# Patient Record
Sex: MALE
[De-identification: names, ages, dates, MRNs, and addresses within clinical notes are randomized per-mention and may not be internally consistent; named-entity substitution may affect disease eponyms.]

---

## 2024-05-03 ENCOUNTER — APPOINTMENT (OUTPATIENT)
Dept: OTOLARYNGOLOGY | Facility: CLINIC | Age: 74
End: 2024-05-03
Payer: MEDICARE

## 2024-05-03 DIAGNOSIS — H93.299 OTHER ABNORMAL AUDITORY PERCEPTIONS, UNSPECIFIED EAR: ICD-10-CM

## 2024-05-03 DIAGNOSIS — Z78.9 OTHER SPECIFIED HEALTH STATUS: ICD-10-CM

## 2024-05-03 DIAGNOSIS — R09.89 OTHER SPECIFIED SYMPTOMS AND SIGNS INVOLVING THE CIRCULATORY AND RESPIRATORY SYSTEMS: ICD-10-CM

## 2024-05-03 DIAGNOSIS — Z86.39 PERSONAL HISTORY OF OTHER ENDOCRINE, NUTRITIONAL AND METABOLIC DISEASE: ICD-10-CM

## 2024-05-03 DIAGNOSIS — Z86.79 PERSONAL HISTORY OF OTHER DISEASES OF THE CIRCULATORY SYSTEM: ICD-10-CM

## 2024-05-03 PROBLEM — Z00.00 ENCOUNTER FOR PREVENTIVE HEALTH EXAMINATION: Status: ACTIVE | Noted: 2024-05-03

## 2024-05-03 PROCEDURE — 92550 TYMPANOMETRY & REFLEX THRESH: CPT | Mod: 52

## 2024-05-03 PROCEDURE — 31231 NASAL ENDOSCOPY DX: CPT

## 2024-05-03 PROCEDURE — 92557 COMPREHENSIVE HEARING TEST: CPT

## 2024-05-03 PROCEDURE — 99203 OFFICE O/P NEW LOW 30 MIN: CPT | Mod: 25

## 2024-05-03 RX ORDER — RIVAROXABAN 2.5 MG/1
TABLET, FILM COATED ORAL
Refills: 0 | Status: ACTIVE | COMMUNITY

## 2024-05-03 RX ORDER — OLMESARTAN MEDOXOMIL 40 MG/1
TABLET, FILM COATED ORAL
Refills: 0 | Status: ACTIVE | COMMUNITY

## 2024-05-03 RX ORDER — AMLODIPINE BESYLATE 5 MG/1
TABLET ORAL
Refills: 0 | Status: ACTIVE | COMMUNITY

## 2024-05-03 NOTE — ASSESSMENT
[FreeTextEntry1] : He has a 2-month history of left-sided tinnitus.  He is ears were normal on exam.  Audiogram showed a bilateral sensorineural hearing loss with a slight asymmetry in the left ear.  He also had mild eustachian tube dysfunction with type Ad tympanograms  He has chronic throat clearing and phlegm in the morning.  On exam, he had a deviated septum to the right, inferior turbinate hypertrophy, and thick opaque mucus on the left side possibly from the middle meatus.  We discussed the possibility of a persistent sinus infection.  He does have a history of dental implants on that side.  He also has a history of intermittent left-sided headaches.  PLAN  - findings and management options discussed with the patient.  - Good aural hygiene. - noise precautions - repeat audiogram in 1 year - literature regarding tinnitus given - Avoid substances that can make tinnitus worse.    - They may use a white noise maker or leave the tv/radio on when it is quiet  -We discussed treatment options for tinnitus including hearing aid, tinnitus masker, and tinnitus therapy - He was given literature regarding postnasal drip - I recommended nasal saline irrigations and a trial of a nasal steroid spray -We discussed placing him on a course of antibiotics for the drainage.  I am going to do that - We discussed obtaining an MRI of the IACs to rule out retrocochlear pathology causing the left-sided tinnitus and mild asymmetry in hearing.  It should also include the maxillary sinus.  He is going to go for the scan.  We may also need a CT scan of the sinuses depending on the results. - follow up i after the MRI - call and return earlier if any concerns

## 2024-05-03 NOTE — REASON FOR VISIT
[Initial Evaluation] : an initial evaluation for [Tinnitus] : tinnitus [FreeTextEntry2] : throat clearing

## 2024-05-03 NOTE — CONSULT LETTER
[Dear  ___] : Dear  [unfilled], [Courtesy Letter:] : I had the pleasure of seeing your patient, [unfilled], in my office today. [Please see my note below.] : Please see my note below. [Consult Closing:] : Thank you very much for allowing me to participate in the care of this patient.  If you have any questions, please do not hesitate to contact me. [Sincerely,] : Sincerely, [FreeTextEntry3] : Mariel Lawrence MD [facility] at [facility] Otolaryngology  Head & Neck Surgery [facility] Eye, Ear & Throat [facility]   Department of Otolaryngology [facility] at [facility]  Office Tel: [phone]

## 2024-05-03 NOTE — HISTORY OF PRESENT ILLNESS
[de-identified] : SANDOR SIMS is a 73 year old patient Here for a 2-month history of tinnitus in the left ear.  He started taking a statin for elevated cholesterol about 4 months ago.  He then developed left-sided tinnitus.  It is constant and high-pitched.  He has no otalgia, otorrhea, or dizziness.  He stopped the statin but the tinnitus has persisted.  He also has a history of throat clearing and phlegm in the morning for the past 2.5 years.  He denies throat pain, dysphagia, voice change, nasal obstruction, or nasal congestion.  He did have dental implants placed on the left side about 2 years ago. HE also has left sided headaches.   No history of recurrent ear infections, prior otologic surgery, ear trauma, or excessive noise exposure  He may have TMJ dysfunction. He does not smoke He does not suffer from allergies

## 2024-05-03 NOTE — PHYSICAL EXAM
[TextEntry] : PHYSICAL EXAM  General: The patient was alert, oriented and in no distress. Voice was clear.  Face: The patient had no facial asymmetry or mass. The skin was unremarkable.  Ears: Hearing normal to conversational voice External ears were normal without deformity. Ear canals were clear. No cerumen or inflammation Tympanic membranes were intact and normal. No perforation or effusion. mobile  Nose:  The external nose had no significant deformity.   . On anterior rhinoscopy, the nasal mucosa was clear.  The anterior septum was grossly midline. There were no visualized polyps, purulence  or masses.   Oral cavity: Oral mucosa- normal. Oral and base of tongue- clear and without mass. Gingival and buccal mucosa- moist and without lesions. Palate- the palate moved well. There was no cleft palate. There appeared to be good salivary flow.   Oral cavity/oropharynx- no pus, erythema or mass  Neck:  The neck was symmetrical. The parotid and submandibular glands were normal without masses. The trachea was midline and there was no unusual crepitus. Thyroid was smooth and nontender and no masses were palpated. No masses  Lymphatics: Cervical adenopathy- none.    PROCEDURE:  FLEXIBLE LARYNGOSCOPY, NASAL ENDOSCOPY   Surgeon: Dr. Lawrence Indication: Throat clearing, phlegm, assess for postnasal drip and sinus infection, inadequate exam on anterior rhinoscopy Anesthetic: Topical lidocaine and Afrin Procedure: The patient was placed in a sitting position.  Following application of the topical anesthetic and decongestant, exam was performed with a flexible telescope.  The scope was passed along the right nasal floor to the nasopharynx.  It was then passed into the region of the middle meatus, middle turbinate, and sphenoethmoid region.  An identical procedure was performed on the left side.  The following findings were noted:  Nasal mucosa: Mild edema Septum: deviated to the right Right nasal cavity      Inferior turbinate: Hypertrophic      Middle turbinate: normal      Superior turbinate: normal      Inferior meatus: no pus, polyps or congestion      Middle meatus:  no pus, polyps or congestion       Superior meatus:  no pus, polyps, or congestion      Sphenoethmoidal recess: no pus, polyps or congestion  Left nasal cavity-there was thick mucoid drainage along the floor of the posterior nasal cavity streaming from the posterior middle meatus      Inferior turbinate: Hypertrophic      Middle turbinate: normal      Superior turbinate: normal      Inferior meatus: no pus, polyps or congestion      Middle meatus: no pus, polyps, or congestion      Superior meatus:  no pus, polyps, or congestion      Sphenoethmoidal recess: no pus, polyps or congestion   Nasopharynx: no masses, choanae patent, no adenoid tissue  Base of tongue and vallecula: no masses or asymmetry Posterior pharyngeal wall: no masses.  Hypopharynx: symmetrical. No masses Pyriform sinuses: no lesions or pooling of secretions Epiglottis: normal. No edema or lesions Aryepiglottic folds: normal. No lesions.  True vocal cords: clear and mobile. No lesions. Airway patent False vocal cords: normal Ventricles: no masses.  Arytenoids: Normal Interarytenoid area: no masses.  Normal Subglottis: normal. no masses

## 2024-05-06 PROBLEM — H93.299 ABNORMAL AUDITORY PERCEPTION: Status: ACTIVE | Noted: 2024-05-06

## 2024-05-21 ENCOUNTER — APPOINTMENT (OUTPATIENT)
Dept: MRI IMAGING | Facility: CLINIC | Age: 74
End: 2024-05-21
Payer: MEDICARE

## 2024-05-21 ENCOUNTER — OUTPATIENT (OUTPATIENT)
Dept: OUTPATIENT SERVICES | Facility: HOSPITAL | Age: 74
LOS: 1 days | End: 2024-05-21

## 2024-05-21 PROCEDURE — 70553 MRI BRAIN STEM W/O & W/DYE: CPT | Mod: 26,MH

## 2024-06-07 ENCOUNTER — APPOINTMENT (OUTPATIENT)
Dept: OTOLARYNGOLOGY | Facility: CLINIC | Age: 74
End: 2024-06-07
Payer: MEDICARE

## 2024-06-07 DIAGNOSIS — J32.8 OTHER CHRONIC SINUSITIS: ICD-10-CM

## 2024-06-07 DIAGNOSIS — R09.82 POSTNASAL DRIP: ICD-10-CM

## 2024-06-07 PROCEDURE — 99213 OFFICE O/P EST LOW 20 MIN: CPT

## 2024-06-07 NOTE — CONSULT LETTER
[Dear  ___] : Dear  [unfilled], [Courtesy Letter:] : I had the pleasure of seeing your patient, [unfilled], in my office today. [Please see my note below.] : Please see my note below. [Consult Closing:] : Thank you very much for allowing me to participate in the care of this patient.  If you have any questions, please do not hesitate to contact me. [Sincerely,] : Sincerely, [FreeTextEntry3] : Mariel Lawrence MD [facility] at [facility] Otolaryngology  Head & Neck Surgery [facility] Eye, Ear & Throat [facility]   Department of Otolaryngology [facility] at [facility]  Office Tel: [phone]

## 2024-06-07 NOTE — PHYSICAL EXAM
[TextEntry] : General: The patient was alert, oriented and in no distress. Voice was clear.  Face: The patient had no facial asymmetry or mass. The skin was unremarkable.  Ears: Hearing normal to conversational voice External ears were normal without deformity.   Nose: The external nose had no significant deformity. . On anterior rhinoscopy, the nasal mucosa was clear. The anterior septum was grossly midline. There were no visualized polyps, purulence or masses.  Neck: The neck was symmetrical.     PROCEDURE:   NASAL ENDOSCOPY  Surgeon: Dr. Lawrence Indication: chronic left maxillary sinusitis, assess for persistent infection inadequate exam on anterior rhinoscopy Anesthetic: Topical lidocaine and Afrin Procedure: The patient was placed in a sitting position. Following application of the topical anesthetic and decongestant, exam was performed with a flexible telescope. The scope was passed along the left nasal floor to the nasopharynx. It was then passed into the region of the middle meatus, middle turbinate, and sphenoethmoid region. The following findings were noted:  Nasal mucosa: Mild edema Septum: deviated to the right  Right nasal cavity  Inferior turbinate: Hypertrophic Left nasal cavity-  Inferior turbinate: Hypertrophic  Middle turbinate: normal  Superior turbinate: normal  Inferior meatus: no pus, polyps or congestion  Middle meatus: There was still whitish drainage from the middle meatus.  A culture was taken  Superior meatus: no pus, polyps, or congestion  Sphenoethmoidal recess: no pus, polyps or congestion  Nasopharynx: no masses, choanae patent, no adenoid tissue

## 2024-06-07 NOTE — HISTORY OF PRESENT ILLNESS
[de-identified] : SANDOR SIMS is a 73 year old patient Here to review his MRI.  He has a history of left-sided tinnitus for the past 2 to 3 months.  He had a mild asymmetry in his hearing on audiogram.  The tinnitus may be a little bit less bothersome.  The MRI showed no IAC masses or lesions.  There was mild parenchymal volume loss.  There was enhancement and opacification of the hypoplastic left maxillary sinus with mild mucosal thickening in the ethmoid air cells  He was also found to have chronic left maxillary sinusitis on nasal endoscopy.  He has had throat clearing and phlegm in the throat for at least 2 years.  He was placed on antibiotics.  It may have helped a little bit.  ENT history No history of recurrent ear infections, prior otologic surgery, ear trauma, or excessive noise exposure  He may have TMJ dysfunction. He does not smoke He does not suffer from allergies

## 2024-06-07 NOTE — ASSESSMENT
[FreeTextEntry1] : He has a history of left-sided tinnitus.  There were no IAC masses.  The tinnitus may be less bothersome.  It is unclear if it could be related to the chronic sinus condition on that side.  He has chronic left sinusitis.  There was some improvement with the antibiotic but there is still persistent drainage.  A culture was taken.  The sinus was opacified on the MRI  Plan -Findings and management options were discussed with the patient. - Continue good ear hygiene - Monitor hearing - Continue management of tinnitus - Nasal saline irrigations and nasal steroid spray as needed - He was asked to call for the culture results.  We may consider another course of antibiotics - I am sending him for CT scan of the sinuses.  He may require drainage of the sinus - I have asked him to review the MRI findings of mild parenchymal volume loss with Dr. Del Rosario.  He may also consider seeing a neurologist - Follow-up after the CT scan - He should call and return earlier if any problems or worsening symptoms

## 2024-06-12 LAB — EAR NOSE AND THROAT CULTURE: ABNORMAL

## 2024-06-19 ENCOUNTER — OUTPATIENT (OUTPATIENT)
Dept: OUTPATIENT SERVICES | Facility: HOSPITAL | Age: 74
LOS: 1 days | End: 2024-06-19

## 2024-06-19 ENCOUNTER — APPOINTMENT (OUTPATIENT)
Dept: CT IMAGING | Facility: CLINIC | Age: 74
End: 2024-06-19
Payer: MEDICARE

## 2024-06-19 PROCEDURE — 70486 CT MAXILLOFACIAL W/O DYE: CPT | Mod: 26,MH

## 2024-06-28 ENCOUNTER — APPOINTMENT (OUTPATIENT)
Dept: OTOLARYNGOLOGY | Facility: CLINIC | Age: 74
End: 2024-06-28

## 2024-06-28 ENCOUNTER — APPOINTMENT (OUTPATIENT)
Dept: OTOLARYNGOLOGY | Facility: CLINIC | Age: 74
End: 2024-06-28
Payer: MEDICARE

## 2024-06-28 DIAGNOSIS — J34.2 DEVIATED NASAL SEPTUM: ICD-10-CM

## 2024-06-28 DIAGNOSIS — J32.0 CHRONIC MAXILLARY SINUSITIS: ICD-10-CM

## 2024-06-28 DIAGNOSIS — M26.609 UNSPECIFIED TEMPOROMANDIBULAR JOINT DISORDER: ICD-10-CM

## 2024-06-28 DIAGNOSIS — H90.3 SENSORINEURAL HEARING LOSS, BILATERAL: ICD-10-CM

## 2024-06-28 DIAGNOSIS — H93.12 TINNITUS, LEFT EAR: ICD-10-CM

## 2024-06-28 PROCEDURE — 99214 OFFICE O/P EST MOD 30 MIN: CPT

## 2024-07-25 ENCOUNTER — APPOINTMENT (OUTPATIENT)
Dept: OTOLARYNGOLOGY | Facility: CLINIC | Age: 74
End: 2024-07-25
Payer: MEDICARE

## 2024-07-25 DIAGNOSIS — J34.2 DEVIATED NASAL SEPTUM: ICD-10-CM

## 2024-07-25 DIAGNOSIS — J32.0 CHRONIC MAXILLARY SINUSITIS: ICD-10-CM

## 2024-07-25 PROCEDURE — 31295Z: CUSTOM | Mod: LT

## 2024-07-25 PROCEDURE — 61782 SCAN PROC CRANIAL EXTRA: CPT

## 2024-07-25 RX ORDER — FAMOTIDINE 20 MG/1
20 TABLET, FILM COATED ORAL
Qty: 60 | Refills: 3 | Status: ACTIVE | COMMUNITY
Start: 2024-07-25 | End: 1900-01-01

## 2024-07-25 RX ORDER — AMOXICILLIN AND CLAVULANATE POTASSIUM 875; 125 MG/1; MG/1
875-125 TABLET, COATED ORAL
Qty: 20 | Refills: 1 | Status: ACTIVE | COMMUNITY
Start: 2024-07-25 | End: 1900-01-01

## 2024-07-25 RX ORDER — PREDNISONE 10 MG/1
10 TABLET ORAL
Qty: 12 | Refills: 0 | Status: ACTIVE | COMMUNITY
Start: 2024-07-25 | End: 1900-01-01

## 2024-08-08 ENCOUNTER — APPOINTMENT (OUTPATIENT)
Dept: OTOLARYNGOLOGY | Facility: CLINIC | Age: 74
End: 2024-08-08

## 2024-08-08 PROCEDURE — 99213 OFFICE O/P EST LOW 20 MIN: CPT

## 2024-08-08 NOTE — HISTORY OF PRESENT ILLNESS
[de-identified] : SANDOR SIMS is a 73 year old patient Here for follow-up.  He underwent left maxillary sinus balloon sinuplasty with partial uncinectomy on July 25.  He has been doing well since then.  He has no sinus pain or pressure.  He has no nasal drainage, congestion, or obstruction.  He uses nasal saline rinses as needed.  He had also had the left dental extraction.    He continues to suffer from tinnitus.  ENT history No history of recurrent ear infections, prior otologic surgery, ear trauma, or excessive noise exposure He has a several month history of left-sided tinnitus. There was a mild asymmetry on his audiogram. MRI showed no IAC masses or lesions. There was mild parenchymal volume loss. 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 He does not smoke He does not suffer from allergies  CT scan showed complete opacification of the left maxillary sinus with OMU obstruction. There is mucosal thickening involving the inferior frontal sinus on the left side as well as the anterior ethmoid air cells. There is a periapical lucency of the left second maxillary molar. The septum was also deviated.

## 2024-08-08 NOTE — ASSESSMENT
[FreeTextEntry1] : He is doing well following of the procedure.  Nasal endoscopy shows that the area is well-healed.  There is no purulent drainage, congestion, or edema.  He has no nasal or sinus symptoms  He has persistent left-sided tinnitus  Plan -Findings and management options were discussed with the patient. - Nasal saline irrigations and nasal steroid spray as needed - Management of tinnitus again reviewed - Monitor hearing - He may consider hearing aid evaluation if the tinnitus is bothersome - I will see him back in 3 months if he is doing well - He should call return earlier if any problems or worsening symptoms

## 2024-08-08 NOTE — PHYSICAL EXAM
[TextEntry] : General: The patient was alert, oriented and in no distress. Voice was clear.  Face: The patient had no facial asymmetry or mass. The skin was unremarkable.  Ears: Hearing normal to conversational voice External ears were normal without deformity.   Nose: The external nose had no significant deformity. . On anterior rhinoscopy, the nasal mucosa was clear. The anterior septum was grossly midline. There were no visualized polyps, purulence or masses.  Oral cavity: Oral mucosa- normal. Oral and base of tongue- clear and without mass. Gingival and buccal mucosa- moist and without lesions. Palate- the palate moved well. There was no cleft palate. There appeared to be good salivary flow. Oral cavity/oropharynx- no pus, erythema or mass  Neck: The neck was symmetrical.   PROCEDURE: NASAL ENDOSCOPY  Surgeon: Dr. Lawrence Indication: chronic left maxillary sinusitis, s/p sinus procedure, inadequate exam on anterior rhinoscopy Anesthetic: Topical lidocaine and Afrin Procedure: The patient was placed in a sitting position. Following application of the topical anesthetic and decongestant, exam was performed with a flexible telescope. The scope was passed along the left nasal floor to the nasopharynx. It was then passed into the region of the middle meatus, middle turbinate, and sphenoethmoid region. The following findings were noted:  Nasal mucosa: Mild edema Septum: deviated to the right  Left nasal cavity- Inferior turbinate: Hypertrophic Middle turbinate: normal Superior turbinate: normal Inferior meatus: no pus, polyps or congestion Middle meatus: No pus, polyps, or congestion.  The middle turbinate was not lateralized.  I was able to partially see into the antrostomy- no infection seen.  Superior meatus: no pus, polyps, or congestion Sphenoethmoidal recess: no pus, polyps or congestion  Nasopharynx: no masses, choanae patent, no adenoid tissue

## 2024-08-08 NOTE — CONSULT LETTER
[Dear  ___] : Dear  [unfilled], [Courtesy Letter:] : I had the pleasure of seeing your patient, [unfilled], in my office today. [Please see my note below.] : Please see my note below. [Consult Closing:] : Thank you very much for allowing me to participate in the care of this patient.  If you have any questions, please do not hesitate to contact me. [Sincerely,] : Sincerely, [FreeTextEntry3] : Mariel Lawrence MD [facility] at [facility] Otolaryngology  Head & Neck Surgery [facility] Eye, Ear & Throat [facility]   Department of Otolaryngology [facility] at [facility]  Office Tel: [phone]

## 2024-11-14 ENCOUNTER — NON-APPOINTMENT (OUTPATIENT)
Age: 74
End: 2024-11-14

## 2024-11-14 ENCOUNTER — APPOINTMENT (OUTPATIENT)
Dept: OTOLARYNGOLOGY | Facility: CLINIC | Age: 74
End: 2024-11-14
Payer: MEDICARE

## 2024-11-14 DIAGNOSIS — H93.12 TINNITUS, LEFT EAR: ICD-10-CM

## 2024-11-14 DIAGNOSIS — J34.2 DEVIATED NASAL SEPTUM: ICD-10-CM

## 2024-11-14 DIAGNOSIS — H90.3 SENSORINEURAL HEARING LOSS, BILATERAL: ICD-10-CM

## 2024-11-14 DIAGNOSIS — R09.89 OTHER SPECIFIED SYMPTOMS AND SIGNS INVOLVING THE CIRCULATORY AND RESPIRATORY SYSTEMS: ICD-10-CM

## 2024-11-14 DIAGNOSIS — J32.0 CHRONIC MAXILLARY SINUSITIS: ICD-10-CM

## 2024-11-14 PROCEDURE — 31231 NASAL ENDOSCOPY DX: CPT

## 2024-11-14 PROCEDURE — 99213 OFFICE O/P EST LOW 20 MIN: CPT | Mod: 25

## 2024-11-14 RX ORDER — ATORVASTATIN CALCIUM 80 MG/1
TABLET, FILM COATED ORAL
Refills: 0 | Status: ACTIVE | COMMUNITY

## 2025-02-06 ENCOUNTER — APPOINTMENT (OUTPATIENT)
Dept: OTOLARYNGOLOGY | Facility: CLINIC | Age: 75
End: 2025-02-06
Payer: MEDICARE

## 2025-02-06 DIAGNOSIS — H93.12 TINNITUS, LEFT EAR: ICD-10-CM

## 2025-02-06 DIAGNOSIS — H90.3 SENSORINEURAL HEARING LOSS, BILATERAL: ICD-10-CM

## 2025-02-06 DIAGNOSIS — M26.609 UNSPECIFIED TEMPOROMANDIBULAR JOINT DISORDER: ICD-10-CM

## 2025-02-06 DIAGNOSIS — J32.0 CHRONIC MAXILLARY SINUSITIS: ICD-10-CM

## 2025-02-06 DIAGNOSIS — J34.2 DEVIATED NASAL SEPTUM: ICD-10-CM

## 2025-02-06 DIAGNOSIS — R09.82 POSTNASAL DRIP: ICD-10-CM

## 2025-02-06 PROCEDURE — 99214 OFFICE O/P EST MOD 30 MIN: CPT

## 2025-02-06 RX ORDER — IPRATROPIUM BROMIDE 21 UG/1
0.03 SPRAY NASAL
Qty: 1 | Refills: 3 | Status: ACTIVE | COMMUNITY
Start: 2025-02-06 | End: 1900-01-01

## 2025-05-29 ENCOUNTER — APPOINTMENT (OUTPATIENT)
Dept: OTOLARYNGOLOGY | Facility: CLINIC | Age: 75
End: 2025-05-29
Payer: MEDICARE

## 2025-05-29 DIAGNOSIS — R09.82 POSTNASAL DRIP: ICD-10-CM

## 2025-05-29 DIAGNOSIS — H93.12 TINNITUS, LEFT EAR: ICD-10-CM

## 2025-05-29 DIAGNOSIS — J32.0 CHRONIC MAXILLARY SINUSITIS: ICD-10-CM

## 2025-05-29 DIAGNOSIS — M26.609 UNSPECIFIED TEMPOROMANDIBULAR JOINT DISORDER: ICD-10-CM

## 2025-05-29 DIAGNOSIS — J34.2 DEVIATED NASAL SEPTUM: ICD-10-CM

## 2025-05-29 DIAGNOSIS — H90.3 SENSORINEURAL HEARING LOSS, BILATERAL: ICD-10-CM

## 2025-05-29 PROCEDURE — 99213 OFFICE O/P EST LOW 20 MIN: CPT | Mod: 25

## 2025-05-29 PROCEDURE — 31231 NASAL ENDOSCOPY DX: CPT
